# Patient Record
Sex: MALE | Race: WHITE | NOT HISPANIC OR LATINO | URBAN - NONMETROPOLITAN AREA
[De-identification: names, ages, dates, MRNs, and addresses within clinical notes are randomized per-mention and may not be internally consistent; named-entity substitution may affect disease eponyms.]

---

## 2018-09-06 PROBLEM — H35.372: Noted: 2019-10-08

## 2018-09-06 PROBLEM — Z96.1: Noted: 2018-09-06

## 2018-09-06 PROBLEM — H25.11: Noted: 2019-10-08

## 2018-09-06 PROBLEM — H43.393: Noted: 2019-10-08

## 2019-10-08 ENCOUNTER — IMPORTED ENCOUNTER (OUTPATIENT)
Dept: URBAN - NONMETROPOLITAN AREA CLINIC 1 | Facility: CLINIC | Age: 69
End: 2019-10-08

## 2019-10-08 PROCEDURE — 92134 CPTRZ OPH DX IMG PST SGM RTA: CPT

## 2019-10-08 PROCEDURE — 99213 OFFICE O/P EST LOW 20 MIN: CPT

## 2019-10-08 NOTE — PATIENT DISCUSSION
ERM OS-  discussed findings w/patient-  OCT Mac done today OD: normal limits OS: trace ERM -  no treatment indicated at this time-  monitor yearly w/OCT Mac or prn Cataract OD -  discussed findings w/patient-  no treatment indicated at this time -  UV protection recommended-  monitor yearly or prn PVD OU -  discussed findings w/patient-  no holes tears or detachments seen today -  reviewed S&S associated with tears and detachments.  Patient instructed to call or come in if changes in vision are noted from today -  monitor yearly or prn Pseudophakia OS w/PCO -  discussed findings w/patient-  1+ PCO OS no treatment indicated at this time-  monitor yearly or prn; 's Notes: MR 10/8/2019DFE 10/8/2019

## 2020-10-26 ENCOUNTER — IMPORTED ENCOUNTER (OUTPATIENT)
Dept: URBAN - NONMETROPOLITAN AREA CLINIC 1 | Facility: CLINIC | Age: 70
End: 2020-10-26

## 2020-10-26 PROBLEM — Z96.1: Noted: 2020-10-26

## 2020-10-26 PROBLEM — H43.393: Noted: 2020-10-26

## 2020-10-26 PROBLEM — H25.11: Noted: 2020-10-26

## 2020-10-26 PROCEDURE — 99213 OFFICE O/P EST LOW 20 MIN: CPT

## 2020-10-26 NOTE — PATIENT DISCUSSION
Cataract OD -  discussed findings w/patient-  no treatment indicated at this time -  UV protection recommended-  monitor yearly or prn PVD OS-  discussed findings w/patient-  no holes tears or detachments seen today -  reviewed S&S associated with tears and detachments.  Patient instructed to call or come in if changes in vision are noted from today -  monitor yearly or prn Pseudophakia OS -  discussed findings w/patient-  monitor yearly or prn; 's Notes: MR 10/8/2019DFE 10/8/2019

## 2022-04-15 ASSESSMENT — VISUAL ACUITY
OD_CC: 20/20
OS_CC: 20/25-2
OS_CC: 20/25-2
OD_CC: 20/20

## 2022-04-15 ASSESSMENT — TONOMETRY
OS_IOP_MMHG: 14
OD_IOP_MMHG: 12

## 2022-08-04 ENCOUNTER — COMPREHENSIVE EXAM (OUTPATIENT)
Dept: RURAL CLINIC 1 | Facility: CLINIC | Age: 72
End: 2022-08-04

## 2022-08-04 DIAGNOSIS — H25.89: ICD-10-CM

## 2022-08-04 DIAGNOSIS — H43.393: ICD-10-CM

## 2022-08-04 PROCEDURE — 92014 COMPRE OPH EXAM EST PT 1/>: CPT

## 2022-08-04 ASSESSMENT — VISUAL ACUITY
OD_SC: 20/30
OS_SC: 20/40
OU_SC: 20/25-1

## 2022-08-04 ASSESSMENT — TONOMETRY
OD_IOP_MMHG: 16
OS_IOP_MMHG: 15

## 2022-08-04 NOTE — PATIENT DISCUSSION
Cataract OD -  discussed findings w/patient-  no treatment indicated at this time -  UV protection recommended-  monitor yearly or prn PVD OS-  discussed findings w/patient-  no holes tears or detachments seen today -  reviewed S&S associated with tears and detachments. Patient instructed to call or come in if changes in vision are noted from today -  monitor yearly or prn Pseudophakia OS -  discussed findings w/patient-  monitor yearly or prn; 's Notes: MR 10/8/2019DFE 10/8/2019.

## 2024-04-11 ENCOUNTER — ESTABLISHED PATIENT (OUTPATIENT)
Dept: RURAL CLINIC 1 | Facility: CLINIC | Age: 74
End: 2024-04-11

## 2024-04-11 DIAGNOSIS — H25.89: ICD-10-CM

## 2024-04-11 PROCEDURE — 99214 OFFICE O/P EST MOD 30 MIN: CPT

## 2024-04-11 ASSESSMENT — VISUAL ACUITY
OD_SC: 20/30-2
OS_SC: 20/30+2
OU_SC: 20/30+2

## 2024-04-11 ASSESSMENT — TONOMETRY
OS_IOP_MMHG: 16
OD_IOP_MMHG: 16

## 2024-04-22 ENCOUNTER — CONSULTATION/EVALUATION (OUTPATIENT)
Dept: RURAL CLINIC 1 | Facility: CLINIC | Age: 74
End: 2024-04-22

## 2024-04-22 VITALS — HEIGHT: 69 IN | HEART RATE: 65 BPM | DIASTOLIC BLOOD PRESSURE: 80 MMHG | SYSTOLIC BLOOD PRESSURE: 132 MMHG

## 2024-04-22 DIAGNOSIS — H25.11: ICD-10-CM

## 2024-04-22 PROCEDURE — 92136 OPHTHALMIC BIOMETRY: CPT

## 2024-04-22 PROCEDURE — 92025 CPTRIZED CORNEAL TOPOGRAPHY: CPT | Mod: NC

## 2024-04-22 PROCEDURE — 99214 OFFICE O/P EST MOD 30 MIN: CPT

## 2024-04-22 PROCEDURE — 92134 CPTRZ OPH DX IMG PST SGM RTA: CPT | Mod: NC

## 2024-04-22 ASSESSMENT — KERATOMETRY
OD_K2POWER_DIOPTERS: 44.50
OD_AXISANGLE_DEGREES: 180
OS_K2POWER_DIOPTERS: 43.50
OS_AXISANGLE2_DEGREES: 105
OS_K1POWER_DIOPTERS: 45.00
OD_K1POWER_DIOPTERS: 44.00
OD_AXISANGLE2_DEGREES: 090
OS_AXISANGLE_DEGREES: 15

## 2024-04-22 ASSESSMENT — TONOMETRY
OD_IOP_MMHG: 17
OS_IOP_MMHG: 17

## 2024-04-22 ASSESSMENT — VISUAL ACUITY
OD_SC: 20/40
OD_SC: 20/40
OD_BAT: 20/80 WITH BEST CORRECTED VISION
OD_PH: 20/30
OD_PAM: 20/30

## 2024-04-30 ENCOUNTER — SURGERY/PROCEDURE (OUTPATIENT)
Dept: URBAN - METROPOLITAN AREA SURGERY 3 | Facility: SURGERY | Age: 74
End: 2024-04-30

## 2024-04-30 DIAGNOSIS — H25.11: ICD-10-CM

## 2024-04-30 PROCEDURE — 99199PAV PROF ADVANCED VISION PACKAGE

## 2024-04-30 PROCEDURE — 66984AV REMOVE CATARACT, INSERT ADVANCED LENS

## 2024-04-30 PROCEDURE — 68841 INSJ RX ELUT IMPLT LAC CANAL: CPT | Mod: 51,RT

## 2024-04-30 ASSESSMENT — KERATOMETRY
OD_AXISANGLE2_DEGREES: 090
OS_K2POWER_DIOPTERS: 43.50
OD_K2POWER_DIOPTERS: 44.50
OD_K1POWER_DIOPTERS: 44.00
OD_AXISANGLE_DEGREES: 180
OS_AXISANGLE_DEGREES: 15
OS_K1POWER_DIOPTERS: 45.00
OS_AXISANGLE2_DEGREES: 105

## 2024-05-01 ENCOUNTER — POST-OP (OUTPATIENT)
Dept: RURAL CLINIC 1 | Facility: CLINIC | Age: 74
End: 2024-05-01

## 2024-05-01 DIAGNOSIS — Z96.1: ICD-10-CM

## 2024-05-01 PROCEDURE — 99024 POSTOP FOLLOW-UP VISIT: CPT

## 2024-05-01 ASSESSMENT — KERATOMETRY
OS_AXISANGLE2_DEGREES: 105
OS_AXISANGLE_DEGREES: 15
OD_AXISANGLE2_DEGREES: 090
OS_K1POWER_DIOPTERS: 45.00
OD_K1POWER_DIOPTERS: 44.00
OD_AXISANGLE_DEGREES: 180
OS_K2POWER_DIOPTERS: 43.50
OD_K2POWER_DIOPTERS: 44.50

## 2024-05-01 ASSESSMENT — TONOMETRY: OD_IOP_MMHG: 17

## 2024-05-01 ASSESSMENT — VISUAL ACUITY: OD_SC: 20/25+2

## 2024-05-08 ENCOUNTER — POST-OP (OUTPATIENT)
Dept: RURAL CLINIC 1 | Facility: CLINIC | Age: 74
End: 2024-05-08

## 2024-05-08 DIAGNOSIS — Z96.1: ICD-10-CM

## 2024-05-08 PROCEDURE — 99024 POSTOP FOLLOW-UP VISIT: CPT

## 2024-05-08 ASSESSMENT — KERATOMETRY
OD_K1POWER_DIOPTERS: 44.00
OD_AXISANGLE2_DEGREES: 090
OS_K2POWER_DIOPTERS: 43.50
OS_AXISANGLE_DEGREES: 15
OD_K2POWER_DIOPTERS: 44.50
OS_K1POWER_DIOPTERS: 45.00
OD_AXISANGLE_DEGREES: 180
OS_AXISANGLE2_DEGREES: 105

## 2024-05-08 ASSESSMENT — VISUAL ACUITY
OD_SC: 20/25+2
OS_SC: 20/30+2

## 2024-05-08 ASSESSMENT — TONOMETRY
OS_IOP_MMHG: 14
OD_IOP_MMHG: 15

## 2024-05-17 ENCOUNTER — POST-OP (OUTPATIENT)
Dept: RURAL CLINIC 1 | Facility: CLINIC | Age: 74
End: 2024-05-17

## 2024-05-17 DIAGNOSIS — H10.11: ICD-10-CM

## 2024-05-17 PROCEDURE — 99213 OFFICE O/P EST LOW 20 MIN: CPT | Mod: 24

## 2024-05-17 ASSESSMENT — KERATOMETRY
OS_AXISANGLE2_DEGREES: 105
OS_K1POWER_DIOPTERS: 45.00
OD_K2POWER_DIOPTERS: 44.50
OD_K1POWER_DIOPTERS: 44.00
OD_AXISANGLE_DEGREES: 180
OS_K2POWER_DIOPTERS: 43.50
OD_AXISANGLE2_DEGREES: 090
OS_AXISANGLE_DEGREES: 15

## 2024-05-17 ASSESSMENT — TONOMETRY
OS_IOP_MMHG: 16
OD_IOP_MMHG: 16

## 2024-05-17 ASSESSMENT — VISUAL ACUITY
OS_SC: 20/30-1
OD_SC: 20/25-1

## 2025-07-16 ENCOUNTER — COMPREHENSIVE EXAM (OUTPATIENT)
Age: 75
End: 2025-07-16

## 2025-07-16 DIAGNOSIS — H43.813: ICD-10-CM

## 2025-07-16 DIAGNOSIS — H02.834: ICD-10-CM

## 2025-07-16 DIAGNOSIS — H26.491: ICD-10-CM

## 2025-07-16 DIAGNOSIS — H47.323: ICD-10-CM

## 2025-07-16 DIAGNOSIS — Z96.1: ICD-10-CM

## 2025-07-16 DIAGNOSIS — H02.831: ICD-10-CM

## 2025-07-16 PROCEDURE — 92133 CPTRZD OPH DX IMG PST SGM ON: CPT

## 2025-07-16 PROCEDURE — 99214 OFFICE O/P EST MOD 30 MIN: CPT
